# Patient Record
Sex: FEMALE | Race: WHITE | NOT HISPANIC OR LATINO | Employment: PART TIME | URBAN - METROPOLITAN AREA
[De-identification: names, ages, dates, MRNs, and addresses within clinical notes are randomized per-mention and may not be internally consistent; named-entity substitution may affect disease eponyms.]

---

## 2023-02-27 ENCOUNTER — OFFICE VISIT (OUTPATIENT)
Dept: URGENT CARE | Facility: CLINIC | Age: 33
End: 2023-02-27
Payer: COMMERCIAL

## 2023-02-27 ENCOUNTER — HOSPITAL ENCOUNTER (OUTPATIENT)
Facility: MEDICAL CENTER | Age: 33
End: 2023-02-27
Attending: PHYSICIAN ASSISTANT
Payer: COMMERCIAL

## 2023-02-27 VITALS
HEIGHT: 64 IN | DIASTOLIC BLOOD PRESSURE: 64 MMHG | TEMPERATURE: 97.7 F | OXYGEN SATURATION: 95 % | HEART RATE: 65 BPM | WEIGHT: 94 LBS | RESPIRATION RATE: 14 BRPM | SYSTOLIC BLOOD PRESSURE: 98 MMHG | BODY MASS INDEX: 16.05 KG/M2

## 2023-02-27 DIAGNOSIS — R31.0 INTERMITTENT GROSS HEMATURIA: ICD-10-CM

## 2023-02-27 LAB
APPEARANCE UR: CLEAR
BILIRUB UR STRIP-MCNC: NEGATIVE MG/DL
COLOR UR AUTO: YELLOW
GLUCOSE UR STRIP.AUTO-MCNC: NEGATIVE MG/DL
KETONES UR STRIP.AUTO-MCNC: NEGATIVE MG/DL
LEUKOCYTE ESTERASE UR QL STRIP.AUTO: NEGATIVE
NITRITE UR QL STRIP.AUTO: NEGATIVE
PH UR STRIP.AUTO: 5.5 [PH] (ref 5–8)
POCT INT CON NEG: POSITIVE
POCT INT CON POS: POSITIVE
POCT URINE PREGNANCY TEST: NEGATIVE
PROT UR QL STRIP: 30 MG/DL
RBC UR QL AUTO: NORMAL
SP GR UR STRIP.AUTO: >=1.03
UROBILINOGEN UR STRIP-MCNC: 0.2 MG/DL

## 2023-02-27 PROCEDURE — 87086 URINE CULTURE/COLONY COUNT: CPT

## 2023-02-27 PROCEDURE — 99204 OFFICE O/P NEW MOD 45 MIN: CPT | Performed by: PHYSICIAN ASSISTANT

## 2023-02-27 PROCEDURE — 81002 URINALYSIS NONAUTO W/O SCOPE: CPT | Performed by: PHYSICIAN ASSISTANT

## 2023-02-27 PROCEDURE — 81025 URINE PREGNANCY TEST: CPT | Performed by: PHYSICIAN ASSISTANT

## 2023-02-27 RX ORDER — NITROFURANTOIN 25; 75 MG/1; MG/1
100 CAPSULE ORAL 2 TIMES DAILY
Qty: 10 CAPSULE | Refills: 0 | Status: SHIPPED | OUTPATIENT
Start: 2023-02-27 | End: 2023-03-04

## 2023-02-27 NOTE — LETTER
[facility] URGENT CARE  [address]  [place] [address]     February 27, 2023    Patient: Radha Meneses   YOB: 1990   Date of Visit: 2/27/2023       To Whom It May Concern:    Radha Meneses was seen and treated in our department on 2/27/2023.  Is excuse her from work today.    Sincerely,     Issa Jean P.A.-C.

## 2023-02-27 NOTE — PROGRESS NOTES
"Subjective:   Radha Meneses is a 32 y.o. female who presents for UTI (Uti x last Friday , burning and little spotting )        Patient presents with concerns of waxing and waning blood in her urine for the last 3 days.  Urine looks blood-tinged.  Urine is not frothy.  She denies dysuria, urinary urgency, urinary frequency.  She is not experiencing any flank pain or abdominal pain.  She is otherwise feeling well and denies nausea, vomiting, fevers, chills, sore throat.  States that she had similar symptoms several years ago and was evaluated by urology.  Hematuria resolved and they are unsure of the cause.  Denies history of nephrolithiasis.  She is not taking NSAIDs.  Patient is currently menstruating.    Review of Systems   Genitourinary:  Positive for dysuria and hematuria. Negative for frequency and urgency.     PMH:  has no past medical history on file.  MEDS:   Current Outpatient Medications:     nitrofurantoin (MACROBID) 100 MG Cap, Take 1 Capsule by mouth 2 times a day for 5 days., Disp: 10 Capsule, Rfl: 0  ALLERGIES: No Known Allergies  SURGHX: History reviewed. No pertinent surgical history.  SOCHX:    FH: Family history was reviewed, no pertinent findings to report   Objective:   BP 98/64 (BP Location: Left arm, Patient Position: Sitting, BP Cuff Size: Adult)   Pulse 65   Temp 36.5 °C (97.7 °F) (Temporal)   Resp 14   Ht 1.626 m (5' 4\")   Wt 42.6 kg (94 lb)   SpO2 95%   BMI 16.14 kg/m²   Physical Exam  Vitals reviewed.   Constitutional:       General: She is not in acute distress.     Appearance: Normal appearance. She is well-developed. She is not toxic-appearing.   HENT:      Head: Normocephalic and atraumatic.      Right Ear: External ear normal.      Left Ear: External ear normal.      Nose: Nose normal.   Cardiovascular:      Rate and Rhythm: Normal rate and regular rhythm.   Pulmonary:      Effort: Pulmonary effort is normal. No respiratory distress.      Breath sounds: No stridor. "   Abdominal:      Comments: Abdomen is soft and nontender to palpation.  No guarding or rebound tenderness.  No tenderness of McBurney's point.  No CVA tenderness bilaterally.   Skin:     General: Skin is dry.   Neurological:      Comments: Alert and oriented.    Psychiatric:         Speech: Speech normal.         Behavior: Behavior normal.         Results for orders placed or performed in visit on 02/27/23   POCT Urinalysis   Result Value Ref Range    POC Color yellow Negative    POC Appearance clear Negative    POC Glucose negative Negative mg/dL    POC Bilirubin negative Negative mg/dL    POC Ketones negative Negative mg/dL    POC Specific Gravity >=1.030 <1.005 - >1.030    POC Blood moderate Negative    POC Urine PH 5.5 5.0 - 8.0    POC Protein 30 Negative mg/dL    POC Urobiligen 0.2 Negative (0.2) mg/dL    POC Nitrites negative Negative    POC Leukocyte Esterase negative Negative   POCT Pregnancy   Result Value Ref Range    POC Urine Pregnancy Test Negative     Internal Control Positive Positive     Internal Control Negative Positive        Assessment/Plan:   1. Intermittent gross hematuria  - POCT Urinalysis  - POCT Pregnancy  - nitrofurantoin (MACROBID) 100 MG Cap; Take 1 Capsule by mouth 2 times a day for 5 days.  Dispense: 10 Capsule; Refill: 0  - Referral to Urology  - URINE CULTURE(NEW); Future  - Referral to establish with [facility] PCP  - URINE MICROSCOPIC (MICROSCOPIC URINALYSIS); Future  - CBC WITH DIFFERENTIAL; Future  - Comp Metabolic Panel; Future    Patient has moderate RBCs and a small amount of protein in her urine.  Patient's urine is on the dry side and I suspect that mild proteinuria may be secondary to this.    Considerations include but not limited to acute cystitis, menstrual bleeding as hematuria manic, nephrolithiasis, nephrotic or nephrotic syndrome, malignancy.    Cause is unclear.  Patient well-appearing and vital signs are stable.  No abdominal or CVA tenderness.  No indication for  work-up at a higher level of care at this time.  I will obtain microscopic urinalysis and send urine for culture.  We will also obtain a CBC and CMP.  Patient has not established with primary care.  We will have her follow-up with urology for further evaluation and management.    Red flag signs and symptoms reviewed and patient was given strict ED precautions.    Differential diagnosis, natural history, supportive care, and indications for immediate follow-up discussed.

## 2023-02-28 ENCOUNTER — TELEPHONE (OUTPATIENT)
Dept: HEALTH INFORMATION MANAGEMENT | Facility: OTHER | Age: 33
End: 2023-02-28
Payer: COMMERCIAL

## 2023-02-28 DIAGNOSIS — R31.0 INTERMITTENT GROSS HEMATURIA: ICD-10-CM

## 2023-03-02 ENCOUNTER — HOSPITAL ENCOUNTER (OUTPATIENT)
Dept: LAB | Facility: MEDICAL CENTER | Age: 33
End: 2023-03-02
Attending: PHYSICIAN ASSISTANT
Payer: COMMERCIAL

## 2023-03-02 ENCOUNTER — TELEPHONE (OUTPATIENT)
Dept: URGENT CARE | Facility: PHYSICIAN GROUP | Age: 33
End: 2023-03-02
Payer: COMMERCIAL

## 2023-03-02 DIAGNOSIS — R31.0 INTERMITTENT GROSS HEMATURIA: ICD-10-CM

## 2023-03-02 LAB
ALBUMIN SERPL BCP-MCNC: 4.5 G/DL (ref 3.2–4.9)
ALBUMIN/GLOB SERPL: 2.5 G/DL
ALP SERPL-CCNC: 54 U/L (ref 30–99)
ALT SERPL-CCNC: 12 U/L (ref 2–50)
ANION GAP SERPL CALC-SCNC: 8 MMOL/L (ref 7–16)
APPEARANCE UR: ABNORMAL
AST SERPL-CCNC: 13 U/L (ref 12–45)
BACTERIA #/AREA URNS HPF: ABNORMAL /HPF
BACTERIA UR CULT: NORMAL
BASOPHILS # BLD AUTO: 1.2 % (ref 0–1.8)
BASOPHILS # BLD: 0.05 K/UL (ref 0–0.12)
BILIRUB SERPL-MCNC: 0.6 MG/DL (ref 0.1–1.5)
BILIRUB UR QL STRIP.AUTO: NEGATIVE
BUN SERPL-MCNC: 12 MG/DL (ref 8–22)
CALCIUM ALBUM COR SERPL-MCNC: 8.9 MG/DL (ref 8.5–10.5)
CALCIUM SERPL-MCNC: 9.3 MG/DL (ref 8.5–10.5)
CHLORIDE SERPL-SCNC: 107 MMOL/L (ref 96–112)
CO2 SERPL-SCNC: 27 MMOL/L (ref 20–33)
COLOR UR: ABNORMAL
CREAT SERPL-MCNC: 0.65 MG/DL (ref 0.5–1.4)
EOSINOPHIL # BLD AUTO: 0.09 K/UL (ref 0–0.51)
EOSINOPHIL NFR BLD: 2.1 % (ref 0–6.9)
EPI CELLS #/AREA URNS HPF: ABNORMAL /HPF
ERYTHROCYTE [DISTWIDTH] IN BLOOD BY AUTOMATED COUNT: 40 FL (ref 35.9–50)
GFR SERPLBLD CREATININE-BSD FMLA CKD-EPI: 119 ML/MIN/1.73 M 2
GLOBULIN SER CALC-MCNC: 1.8 G/DL (ref 1.9–3.5)
GLUCOSE SERPL-MCNC: 102 MG/DL (ref 65–99)
GLUCOSE UR STRIP.AUTO-MCNC: NEGATIVE MG/DL
HCT VFR BLD AUTO: 41.5 % (ref 37–47)
HGB BLD-MCNC: 14.3 G/DL (ref 12–16)
HYALINE CASTS #/AREA URNS LPF: ABNORMAL /LPF
IMM GRANULOCYTES # BLD AUTO: 0.01 K/UL (ref 0–0.11)
IMM GRANULOCYTES NFR BLD AUTO: 0.2 % (ref 0–0.9)
KETONES UR STRIP.AUTO-MCNC: ABNORMAL MG/DL
LEUKOCYTE ESTERASE UR QL STRIP.AUTO: ABNORMAL
LYMPHOCYTES # BLD AUTO: 1.82 K/UL (ref 1–4.8)
LYMPHOCYTES NFR BLD: 43.1 % (ref 22–41)
MCH RBC QN AUTO: 29.6 PG (ref 27–33)
MCHC RBC AUTO-ENTMCNC: 34.5 G/DL (ref 33.6–35)
MCV RBC AUTO: 85.9 FL (ref 81.4–97.8)
MICRO URNS: ABNORMAL
MONOCYTES # BLD AUTO: 0.35 K/UL (ref 0–0.85)
MONOCYTES NFR BLD AUTO: 8.3 % (ref 0–13.4)
NEUTROPHILS # BLD AUTO: 1.9 K/UL (ref 2–7.15)
NEUTROPHILS NFR BLD: 45.1 % (ref 44–72)
NITRITE UR QL STRIP.AUTO: NEGATIVE
NRBC # BLD AUTO: 0 K/UL
NRBC BLD-RTO: 0 /100 WBC
PH UR STRIP.AUTO: 5.5 [PH] (ref 5–8)
PLATELET # BLD AUTO: 197 K/UL (ref 164–446)
PMV BLD AUTO: 11.6 FL (ref 9–12.9)
POTASSIUM SERPL-SCNC: 4 MMOL/L (ref 3.6–5.5)
PROT SERPL-MCNC: 6.3 G/DL (ref 6–8.2)
PROT UR QL STRIP: 100 MG/DL
RBC # BLD AUTO: 4.83 M/UL (ref 4.2–5.4)
RBC # URNS HPF: >150 /HPF
RBC UR QL AUTO: ABNORMAL
SIGNIFICANT IND 70042: NORMAL
SITE SITE: NORMAL
SODIUM SERPL-SCNC: 142 MMOL/L (ref 135–145)
SOURCE SOURCE: NORMAL
SP GR UR STRIP.AUTO: 1.03
UROBILINOGEN UR STRIP.AUTO-MCNC: 0.2 MG/DL
WBC # BLD AUTO: 4.2 K/UL (ref 4.8–10.8)
WBC #/AREA URNS HPF: ABNORMAL /HPF

## 2023-03-02 PROCEDURE — 80053 COMPREHEN METABOLIC PANEL: CPT

## 2023-03-02 PROCEDURE — 85025 COMPLETE CBC W/AUTO DIFF WBC: CPT

## 2023-03-02 PROCEDURE — 36415 COLL VENOUS BLD VENIPUNCTURE: CPT

## 2023-03-02 PROCEDURE — 81001 URINALYSIS AUTO W/SCOPE: CPT

## 2023-03-02 NOTE — TELEPHONE ENCOUNTER
Labs and urine cx reviewed. Cx negative. Discussed with pt. Pt no longer passing clots but urine is pink.  CT renal colic to further evaluate and will place stat urology referral.

## 2023-03-02 NOTE — RESULT ENCOUNTER NOTE
Discussed results with pt. Does not want to establish with PCP as she is moving to [place] in a few months. CT to further evaluate and stat urology referral.

## 2024-04-29 ENCOUNTER — PHARMACY VISIT (OUTPATIENT)
Dept: PHARMACY | Facility: MEDICAL CENTER | Age: 34
End: 2024-04-29
Payer: COMMERCIAL

## 2024-04-29 ENCOUNTER — OCCUPATIONAL MEDICINE (OUTPATIENT)
Dept: URGENT CARE | Facility: CLINIC | Age: 34
End: 2024-04-29
Payer: OTHER MISCELLANEOUS

## 2024-04-29 VITALS
BODY MASS INDEX: 17.01 KG/M2 | HEART RATE: 76 BPM | TEMPERATURE: 98 F | DIASTOLIC BLOOD PRESSURE: 60 MMHG | SYSTOLIC BLOOD PRESSURE: 100 MMHG | RESPIRATION RATE: 13 BRPM | WEIGHT: 96 LBS | OXYGEN SATURATION: 98 % | HEIGHT: 63 IN

## 2024-04-29 DIAGNOSIS — R20.0 NUMBNESS AND TINGLING IN RIGHT HAND: ICD-10-CM

## 2024-04-29 DIAGNOSIS — R20.2 NUMBNESS AND TINGLING IN RIGHT HAND: ICD-10-CM

## 2024-04-29 DIAGNOSIS — M25.531 RIGHT WRIST PAIN: ICD-10-CM

## 2024-04-29 PROCEDURE — RXMED WILLOW AMBULATORY MEDICATION CHARGE

## 2024-04-29 RX ORDER — PREDNISONE 20 MG/1
20 TABLET ORAL DAILY
Qty: 5 TABLET | Refills: 0 | Status: SHIPPED | OUTPATIENT
Start: 2024-04-29 | End: 2024-05-04

## 2024-04-29 ASSESSMENT — FIBROSIS 4 INDEX: FIB4 SCORE: 0.63

## 2024-04-29 NOTE — PROGRESS NOTES
"Subjective:     [place]ha Meneses is a 33 y.o. female who presents for Wrist Injury ( NEW/ DOI: 04/08/24/ R Wrist. Patient states she is the . 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 Patient states that her right wrist has been hurting and it has been getting worse. States she gets numbness on thumb and pinky and a lot of pain on wrist. )      Patient presents to urgent care with concerns of right wrist pain, numbness and tingling in her right thenar eminence and pinky.  She states that she first noticed the pain and numbness on 04/08/2024.  She denies traumatic injuries.  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  She states the pain has worsened since she first noticed that she is now experiencing weakness as well as numbness.  She denies traumatic injuries to her right wrist.  She has tried wearing her daughters wrist brace with mild alleviation of her symptoms.  She states her symptoms are worse at night.  Symptoms are intermittent.   She is right hand dominant. Denies second job.    PMH:   No pertinent past medical history to this problem  MEDS:  Medications were reviewed in EMR  ALLERGIES:  Allergies were reviewed in EMR  SOCHX:  Works as a   FH:   No pertinent family history to this problem       Objective:     /60   Pulse 76   Temp 36.7 °C (98 °F) (Temporal)   Resp 13   Ht 1.6 m (5' 3\")   Wt 43.5 kg (96 lb)   SpO2 98%   BMI 17.01 kg/m²     Right wrist: No obvious deformity or dislocation to right wrist, slight tenderness along the volar wrist to palpation.  No tenderness to right elbow, shoulder.  Strength is 4 out of 5.   strength is 4 out of 5.  There is full and active range of motion of the right wrist, hand.  Sensation is intact to light and sharp touch, cap refill less than 2 seconds, 2+ radial pulse.  No wasting of the thenar eminence.  Normal pincher grasp.  Positive Tinel.  " Negative Phalen sign.  Negative compression test.    Assessment/Plan:       1. Right wrist pain  - Thumb SPICA  - predniSONE (DELTASONE) 20 MG Tab; Take 1 Tablet by mouth every day for 5 days.  Dispense: 5 Tablet; Refill: 0    2. Numbness and tingling in right hand  - Thumb SPICA  - predniSONE (DELTASONE) 20 MG Tab; Take 1 Tablet by mouth every day for 5 days.  Dispense: 5 Tablet; Refill: 0    Released to Restricted Duty FROM 4/29/2024 TO 5/4/2024  D39 and C4 completed today.  Concerned patient may have CTS.  No obvious deformity or dislocations appreciated.  Imaging not performed today.  Patient fitted with thumb spica brace to allow for rest.  She is to wear the brace while at work.  Encourage patient to use ibuprofen and or Tylenol per package instructions for discomfort.  She is willing to try a low-dose steroid for the week.  Also advised elevation, rest, application of ice packs as needed.  She is to refrain from use of the right hand while at work.  Follow-up in 5 days       Differential diagnosis, natural history, supportive care, and indications for immediate follow-up discussed.

## 2024-04-29 NOTE — LETTER
PHYSICIAN’S AND CHIROPRACTIC PHYSICIAN'S                       PROGRESS REPORT  CERTIFICATION OF DISABILITY Claim Number:        Social Security Number:     Patient’s Name:Radha Meneses Date of Injury:  4/8/2024     Employer:   [employer]  Name of O (if applicable)   Patient’s Job Description/Occupation:      Previous Injuries/Diseases/Surgeries Contributing to the Condition:      Diagnosis:  (M25.531) Right wrist pain  (R20.0,  R20.2) Numbness and tingling in right handDiagnoses of Right wrist pain and Numbness and tingling in right hand were pertinent to this visit.   Related to the Industrial Injury? Yes   Explain:[unfilled]   Objective Medical Findings: Right wrist: No obvious deformity or dislocation to right wrist, slight tenderness along the volar wrist to palpation.  No tenderness to right elbow, shoulder.  Strength is 4 out of 5.   strength is 4 out of 5.  There is full and active range of motion of the right wrist, hand.  Sensation is intact to light and sharp touch, cap refill less than 2 seconds, 2+ radial pulse.  No wasting of the thenar eminence.  Normal pincher grasp.  Positive Tinel.  Negative Phalen sign.  Negative compression test.    []  None - Discharged                         Stable     [x]  Yes     []  No                           Ratable     [x]  Yes     []  No   []  Generally Improved                        []  Condition Worsened                              []  Condition Same         May Have Suffered a Permanent Disability     []  Yes     [x]  No   Treatment Plan: Thumb spica brace to be worn while at work, low-dose prednisone for 5 days, NSAIDs/on package instructions, rest, elevation, application of ice packs/warm packs.  No use of right hand while at work.     [] No Change in Therapy                    [] PT/OT Prescribed                   [] Medication May be Used While Working    [] Case Management                           [] PT/OT Discontinued  []  Consultation    [] Further Diagnostic Studies    [x]  Prescription(s) Prednisone                       [] Released to FULL DUTY /No Restrictions on (Date):                                                                                           [] Certified TOTALLY TEMPORARILY DISABLED (Indicate Dates): From:                       To:                               [x] Released to RESTRICTED/Modified Duty on (Date): From:   04/29/24       To:    05/04/24                         Restrictions Are:     []  Permanent[x]  Temporary   [] No Sitting                   []  No Standing          []  No Pulling             [x]  Other:         No use of right hand       [] No Bending at Waist  []  No Stooping          []  No Lifting                                                                            [] No Carrying                []  No Walking           []  Lifting Restricted to (lbs.):   [] No Pushing                 []  No Climbing         []  No Reaching Above Shoulders   Date of Next Visit: 5/4/2024 Date of this Exam:  4/29/2024 Physician/Chiropractic Physician Name: DINA Bryan Physician/Chiropractic Physician Signature:   Judd Sheikh DO MPH   D-39 (Rev. 2/24)

## 2024-04-29 NOTE — LETTER
"    EMPLOYEE’S CLAIM FOR COMPENSATION/ REPORT OF INITIAL TREATMENT  FORM C-4  PLEASE TYPE OR PRINT    EMPLOYEE’S CLAIM - PROVIDE ALL INFORMATION REQUESTED   First Name                    CHAMP Garcia Last Name  Gideon Birthdate                    1990                Sex  []M  []F Claim Number (Insurer’s Use Only)     Home Address  [address]   [address] Age  33 y.o. Height  1.6 m (5' 3\") Weight  43.5 kg (96 lb) Social Security Number     [facility] Zip  [address] Telephone  [phone] (home) [phone] (work)   Mailing Address  [address]   [address] [facility] Zip  [address] Primary Language Spoken  English    INSURER THIRD-PARTY   Misc Workers Comp   Employee's Occupation (Job Title) When Injury or Occupational Disease Occurred      Employer's Name/Company Name     Telephone      Office Mail Address (Number and Street)  [address]     Date of Injury (if applicable) 4/8/2024               Hours Injury (if applicable)  6:00 AM Date Employer Notified  4/29/2024 Last Day of Work after Injury or Occupational Disease  4/29/2024 Supervisor to Whom Injury     Reported  [place]   Address or Location of Accident (if applicable)  Work [1]   What were you doing at the time of accident? (if applicable)  Working Freight    How did this injury or occupational disease occur? (Be specific and answer in detail. Use additional sheet if necessary)  While working Freight felt a pinch in wrist thumb went numb. Thought it would go away jonly getting worse everyday I work.   If you believe that you have an occupational disease, when did you first have knowledge of the disability and its relationship to your employment?  NA Witnesses to the Accident (if applicable)  NA      Nature of Injury or Occupational Disease  Sprain  Part(s) of Body Injured or Affected  Wrist (R) and " Hand (R) N/A N/A    I CERTIFY THAT THE ABOVE IS TRUE AND CORRECT TO T HE BEST OF MY KNOWLEDGE AND THAT I HAVE PROVIDED THIS INFORMATION IN ORDER TO OBTAIN THE BENEFITS OF [place], OR [place]).  I HEREBY AUTHORIZE ANY PHYSICIAN, CHIROPRACTOR, SURGEON, PRACTITIONER OR ANY OTHER PERSON, ANY HOSPITAL, INCLUDING [facility] OR [facility], ANY  MEDICAL SERVICE ORGANIZATION, ANY INSURANCE COMPANY, OR OTHER INSTITUTION OR ORGANIZATION TO RELEASE TO EACH OTHER, ANY MEDICAL OR OTHER INFORMATION, INCLUDING BENEFITS PAID OR PAYABLE, PERTINENT TO THIS INJURY OR DISEASE, EXCEPT INFORMATION RELATIVE TO DIAGNOSIS, TREATMENT AND/OR COUNSELING FOR AIDS, PSYCHOLOGICAL CONDITIONS, ALCOHOL OR CONTROLLED SUBSTANCES, FOR WHICH I MUST GIVE SPECIFIC AUTHORIZATION.  A PHOTOSTAT OF THIS AUTHORIZATION SHALL BE VALID AS THE ORIGINAL.     Date   Place Employee’s Original or  *Electronic Signature   THIS REPORT MUST BE COMPLETED AND MAILED WITHIN 3 WORKING DAYS OF TREATMENT   Place  [facility]    Name of [facility]   Date 4/29/2024 Diagnosis and Description of Injury or Occupational Disease  (M25.531) Right wrist pain  (R20.0,  R20.2) Numbness and tingling in right hand  Diagnoses of Right wrist pain and Numbness and tingling in right hand were pertinent to this visit. Is there evidence that the injured employee was under the influence of alcohol and/or another controlled substance at the time of accident?  []No  [] Yes (if yes, please explain)   Hour 11:38 AM  No   Treatment: Thumb spica brace, prednisone, NSAIDs/Tylenol per package instructions, rest, elevation, application of ice packs, heat packs.    Have you advised the patient to remain off work five days or more?   [] Yes Indicate dates: From   To    []No If no, is the injured employee capable of: [] full duty [] modified duty                                                              No  Yes  If modified duty, specify any limitations / restrictions:  No use of right hand                                                                                                                                                                                                                                                                                                                                                                                                               X-Ray Findings:      From information given by the employee, together with medical evidence, can you directly connect this injury or occupational disease as job incurred?  []Yes   [] No Yes    Is additional medical care by a physician indicated? []Yes [] No  Yes    Do you know of any previous injury or disease contributing to this condition or occupational disease? []Yes [] No (Explain if yes)                          No   Date  4/29/2024 Print Health Care Provider’s Name  DINA Bryan I certify that the employer’s copy of  this form was delivered to the employer on:   Address  [address] [place] [address] [address] INSURER'S USE ONLY                       [facility] Zip  [address] Provider’s Tax ID Number  [id number]   Telephone  Dept: [phone]    Health Care Provider’s Original or Electronic Signature  e-SignPEDEBBI SANTOYO Degree (MD,DO, DC,PA-C,APRN)  APRN  Choose (if applicable)      ORIGINAL - TREATING HEALTHCARE PROVIDER PAGE 2 - INSURER/TPA PAGE 3 - EMPLOYER PAGE 4 - EMPLOYEE             Form C-4 (rev.08/23)     BRIEF DESCRIPTION OF RIGHTS AND BENEFITS  (Pursuant to [place] 616[place].050)    Notice of Injury or Occupational Disease (Incident Report Form C-1): If an injury or occupational disease (OD) arises out of and in the  course of employment, you must provide written notice to your employer as soon as practicable, but no later than 7 days after the accident or  OD. Your employer  shall maintain a sufficient supply of the required forms.    Employee’s Claim for Compensation/Report of Initial Treatment (Form C-4): If medical treatment is sought, the Form C-4 is available at  the place of initial treatment. A completed Form C-4 must be filed within 90 days after an accident or OD. The treating physician, chiropractic  physician, physician assistant or advanced practice nurse must, within 3 working days after treatment, complete and mail to the employer, the  employer's insurer and third-party , the Claim for Compensation.    Medical Treatment: If you require medical treatment for your on-the-job injury or OD, you may be required to select a physician or  chiropractic physician from a list provided by your workers’ compensation insurer, if it has contracted with an Organization for Managed Care  (MCO) or Preferred Provider Organization (PPO) or providers of health care. If your employer has not entered into a contract with an MCO or  PPO, you may select a physician or chiropractic physician from the Panel of Physicians and Chiropractic Physicians. Any medical costs related  to your industrial injury or OD will be paid by your insurer.    Temporary Total Disability (TTD): If your doctor has certified that you are unable to work for a period of at least 5 consecutive days, or 5  cumulative days in a 20-day period, or places restrictions on you that your employer does not accommodate, you may be entitled to TTD  compensation.    Temporary Partial Disability (TPD): If the wage you receive upon reemployment is less than the compensation for TTD to which you are  entitled, the insurer may be required to pay you TPD compensation to make up the difference. TPD can only be paid for a maximum of 24  months.    Permanent Partial Disability (PPD): When your medical condition is stable and there is an indication of a PPD as a result of your injury or  OD, within 30 days, your insurer must  arrange for an evaluation by a rating physician or chiropractic physician to determine the degree of your  PPD. The amount of your PPD award depends on the date of injury, the results of the PPD evaluation, your age and wage.    Permanent Total Disability (PTD): If you are medically certified by a treating physician or chiropractic physician as permanently and totally  disabled and have been granted a PTD status by your insurer, you are entitled to receive monthly benefits not to exceed 66 2/3% of your  average monthly wage. The amount of your PTD payments is subject to reduction if you previously received a lump-sum PPD award.    Vocational Rehabilitation Services: You may be eligible for vocational rehabilitation services if you are unable to return to the job due to a  permanent physical impairment or permanent restrictions as a result of your injury or occupational disease.    Transportation and Per Shahrzad Reimbursement: You may be eligible for travel expenses and per shahrzad associated with medical treatment.    Reopening: You may be able to reopen your claim if your condition worsens after claim closure.    Appeal Process: If you disagree with a written determination issued by the insurer or the insurer does not respond to your request, you may  appeal to the Department of Administration, , by following the instructions contained in your determination letter. You must  appeal the determination within 70 days from the date of the determination letter at [address], [address], [place], [place]  [address], or [address] [facility], [address], [place] [address]. If you disagree with the  decision, you may appeal to the  Department of Administration, . You must file your appeal within 30 days from the date of the  decision letter  at [address], [address], [place], [place] [address], or [address], [facility]  [place] [address]. If you  disagree with a decision of an , you may file a petition for judicial review with the District Court. You must do so within 30  days of the ’s decision. You may be represented by an  at your own expense, or you may contact the [facility] for possible  Representation.    [place]  for Injured Workers (NAIW): If you disagree with a  decision, you may request that NAIW represent you  without charge at an  Hearing. For information regarding denial of benefits, you may contact the [facility] at: [address], [address], [place] [address], [phone], or [address] [facility], [address], [place] [address], [phone]    To File a Complaint with the Division: If you wish to file a complaint with the  of the Division of Industrial Relations (DIR),  please contact the Workers’ Compensation Section, [address] [place] [address] [address], [place] [address], telephone [phone], or  [address] [facility] [facility] [address], [place], [place] [address], telephone [phone].    For AssistancewithWorkers’Compensation Issues: You may contact the [facility] Office for Consumer Health Assistance, [address] [place] [address], Toll Free 1-627.386.1104, Web site:  https://adsd.[place].[facility]/Programs/MARK/Office_for_Consumer_Health_Assistance_(OCHA)/ E-mail: [email or web address].[facility]              __________________________________________________________________                                    _________________            Employee Name / Signature                                                                                                                            Date                                                                                                                                                                                                                              D-2 (rev. 02/24)